# Patient Record
Sex: FEMALE | Race: WHITE | NOT HISPANIC OR LATINO | Employment: OTHER | URBAN - METROPOLITAN AREA
[De-identification: names, ages, dates, MRNs, and addresses within clinical notes are randomized per-mention and may not be internally consistent; named-entity substitution may affect disease eponyms.]

---

## 2020-06-30 ENCOUNTER — OUTSIDE FACILITY SERVICE (OUTPATIENT)
Dept: CARDIOLOGY | Facility: CLINIC | Age: 55
End: 2020-06-30

## 2020-07-01 PROCEDURE — 93307 TTE W/O DOPPLER COMPLETE: CPT | Performed by: INTERNAL MEDICINE

## 2020-07-02 PROCEDURE — 99253 IP/OBS CNSLTJ NEW/EST LOW 45: CPT | Performed by: INTERNAL MEDICINE

## 2020-07-06 ENCOUNTER — HOSPITAL ENCOUNTER (OUTPATIENT)
Facility: HOSPITAL | Age: 55
Discharge: HOME | End: 2020-07-07
Attending: INTERNAL MEDICINE | Admitting: INTERNAL MEDICINE

## 2020-07-06 DIAGNOSIS — I50.21 ACUTE SYSTOLIC CONGESTIVE HEART FAILURE (HCC): Primary | ICD-10-CM

## 2020-07-06 PROBLEM — I50.9 CHF (CONGESTIVE HEART FAILURE): Status: ACTIVE | Noted: 2020-07-06

## 2020-07-06 PROBLEM — I25.10 CAD (CORONARY ARTERY DISEASE): Status: ACTIVE | Noted: 2020-07-06

## 2020-07-06 PROBLEM — E66.9 CLASS 1 OBESITY IN ADULT: Status: ACTIVE | Noted: 2020-07-06

## 2020-07-06 PROBLEM — E78.5 HYPERLIPIDEMIA: Status: ACTIVE | Noted: 2020-07-06

## 2020-07-06 PROBLEM — E11.9 DIABETES MELLITUS (HCC): Status: ACTIVE | Noted: 2020-07-06

## 2020-07-06 PROBLEM — N18.9 CHRONIC KIDNEY DISEASE (CKD): Status: ACTIVE | Noted: 2020-07-06

## 2020-07-06 PROBLEM — Z72.0 TOBACCO ABUSE: Status: ACTIVE | Noted: 2020-07-06

## 2020-07-06 PROBLEM — I10 HYPERTENSION: Status: ACTIVE | Noted: 2020-07-06

## 2020-07-06 PROBLEM — G47.30 SLEEP APNEA: Status: ACTIVE | Noted: 2020-07-06

## 2020-07-06 LAB
ACT BLD: 246 SECONDS (ref 82–152)
CHOLEST SERPL-MCNC: 196 MG/DL (ref 0–200)
GLUCOSE BLDC GLUCOMTR-MCNC: 136 MG/DL (ref 70–130)
GLUCOSE BLDC GLUCOMTR-MCNC: 273 MG/DL (ref 70–130)
HBA1C MFR BLD: 12.9 % (ref 4.8–5.6)
HDLC SERPL-MCNC: 55 MG/DL (ref 40–60)
HOLD SPECIMEN: NORMAL
LDLC SERPL CALC-MCNC: 112 MG/DL (ref 0–100)
LDLC/HDLC SERPL: 2.03 {RATIO}
SARS-COV-2 RNA RESP QL NAA+PROBE: NOT DETECTED
TRIGL SERPL-MCNC: 146 MG/DL (ref 0–150)
VLDLC SERPL-MCNC: 29.2 MG/DL
WHOLE BLOOD HOLD SPECIMEN: NORMAL
WHOLE BLOOD HOLD SPECIMEN: NORMAL

## 2020-07-06 PROCEDURE — 93458 L HRT ARTERY/VENTRICLE ANGIO: CPT | Performed by: INTERNAL MEDICINE

## 2020-07-06 PROCEDURE — C1874 STENT, COATED/COV W/DEL SYS: HCPCS | Performed by: INTERNAL MEDICINE

## 2020-07-06 PROCEDURE — 87635 SARS-COV-2 COVID-19 AMP PRB: CPT | Performed by: PHYSICIAN ASSISTANT

## 2020-07-06 PROCEDURE — G0378 HOSPITAL OBSERVATION PER HR: HCPCS

## 2020-07-06 PROCEDURE — C9600 PERC DRUG-EL COR STENT SING: HCPCS | Performed by: INTERNAL MEDICINE

## 2020-07-06 PROCEDURE — 80061 LIPID PANEL: CPT | Performed by: PHYSICIAN ASSISTANT

## 2020-07-06 PROCEDURE — 25010000002 MIDAZOLAM PER 1 MG: Performed by: INTERNAL MEDICINE

## 2020-07-06 PROCEDURE — C1769 GUIDE WIRE: HCPCS | Performed by: INTERNAL MEDICINE

## 2020-07-06 PROCEDURE — 99204 OFFICE O/P NEW MOD 45 MIN: CPT | Performed by: PHYSICIAN ASSISTANT

## 2020-07-06 PROCEDURE — C9803 HOPD COVID-19 SPEC COLLECT: HCPCS

## 2020-07-06 PROCEDURE — 93010 ELECTROCARDIOGRAM REPORT: CPT | Performed by: INTERNAL MEDICINE

## 2020-07-06 PROCEDURE — 93005 ELECTROCARDIOGRAM TRACING: CPT | Performed by: INTERNAL MEDICINE

## 2020-07-06 PROCEDURE — C1887 CATHETER, GUIDING: HCPCS | Performed by: INTERNAL MEDICINE

## 2020-07-06 PROCEDURE — 82962 GLUCOSE BLOOD TEST: CPT

## 2020-07-06 PROCEDURE — 0 IOPAMIDOL PER 1 ML: Performed by: INTERNAL MEDICINE

## 2020-07-06 PROCEDURE — 83036 HEMOGLOBIN GLYCOSYLATED A1C: CPT | Performed by: PHYSICIAN ASSISTANT

## 2020-07-06 PROCEDURE — 25010000002 HEPARIN (PORCINE) PER 1000 UNITS: Performed by: INTERNAL MEDICINE

## 2020-07-06 PROCEDURE — 85347 COAGULATION TIME ACTIVATED: CPT

## 2020-07-06 PROCEDURE — C1894 INTRO/SHEATH, NON-LASER: HCPCS | Performed by: INTERNAL MEDICINE

## 2020-07-06 PROCEDURE — 92928 PRQ TCAT PLMT NTRAC ST 1 LES: CPT | Performed by: INTERNAL MEDICINE

## 2020-07-06 DEVICE — XIENCE SIERRA™ EVEROLIMUS ELUTING CORONARY STENT SYSTEM 3.50 MM X 38 MM / RAPID-EXCHANGE
Type: IMPLANTABLE DEVICE | Status: FUNCTIONAL
Brand: XIENCE SIERRA™

## 2020-07-06 RX ORDER — ASPIRIN 81 MG/1
81 TABLET, CHEWABLE ORAL DAILY
Status: DISCONTINUED | OUTPATIENT
Start: 2020-07-06 | End: 2020-07-06 | Stop reason: SDUPTHER

## 2020-07-06 RX ORDER — SODIUM CHLORIDE 9 MG/ML
100 INJECTION, SOLUTION INTRAVENOUS CONTINUOUS
Status: ACTIVE | OUTPATIENT
Start: 2020-07-06 | End: 2020-07-06

## 2020-07-06 RX ORDER — CLOPIDOGREL BISULFATE 75 MG/1
75 TABLET ORAL DAILY
Status: DISCONTINUED | OUTPATIENT
Start: 2020-07-07 | End: 2020-07-07 | Stop reason: HOSPADM

## 2020-07-06 RX ORDER — NITROGLYCERIN 0.4 MG/1
0.4 TABLET SUBLINGUAL
Status: DISCONTINUED | OUTPATIENT
Start: 2020-07-06 | End: 2020-07-06

## 2020-07-06 RX ORDER — ASPIRIN 81 MG/1
81 TABLET, CHEWABLE ORAL DAILY
COMMUNITY
End: 2022-08-08 | Stop reason: SDUPTHER

## 2020-07-06 RX ORDER — FUROSEMIDE 20 MG/1
20 TABLET ORAL 2 TIMES DAILY
COMMUNITY
End: 2020-08-20 | Stop reason: SDUPTHER

## 2020-07-06 RX ORDER — ALBUTEROL SULFATE 90 UG/1
2 AEROSOL, METERED RESPIRATORY (INHALATION) AS NEEDED
COMMUNITY
End: 2022-02-03

## 2020-07-06 RX ORDER — NICOTINE POLACRILEX 4 MG
15 LOZENGE BUCCAL
Status: DISCONTINUED | OUTPATIENT
Start: 2020-07-06 | End: 2020-07-07 | Stop reason: HOSPADM

## 2020-07-06 RX ORDER — ASPIRIN 325 MG
325 TABLET, DELAYED RELEASE (ENTERIC COATED) ORAL DAILY
Status: DISCONTINUED | OUTPATIENT
Start: 2020-07-07 | End: 2020-07-06

## 2020-07-06 RX ORDER — ONDANSETRON 2 MG/ML
4 INJECTION INTRAMUSCULAR; INTRAVENOUS EVERY 8 HOURS PRN
Status: DISCONTINUED | OUTPATIENT
Start: 2020-07-06 | End: 2020-07-06

## 2020-07-06 RX ORDER — RAMIPRIL 2.5 MG/1
2.5 CAPSULE ORAL DAILY
COMMUNITY
End: 2020-11-12 | Stop reason: SDUPTHER

## 2020-07-06 RX ORDER — ACETAMINOPHEN 325 MG/1
650 TABLET ORAL EVERY 4 HOURS PRN
Status: DISCONTINUED | OUTPATIENT
Start: 2020-07-06 | End: 2020-07-07 | Stop reason: HOSPADM

## 2020-07-06 RX ORDER — ASPIRIN 81 MG/1
81 TABLET, CHEWABLE ORAL DAILY
Status: DISCONTINUED | OUTPATIENT
Start: 2020-07-06 | End: 2020-07-07 | Stop reason: HOSPADM

## 2020-07-06 RX ORDER — SODIUM CHLORIDE 0.9 % (FLUSH) 0.9 %
10 SYRINGE (ML) INJECTION AS NEEDED
Status: DISCONTINUED | OUTPATIENT
Start: 2020-07-06 | End: 2020-07-06

## 2020-07-06 RX ORDER — ASPIRIN 325 MG
325 TABLET ORAL ONCE
Status: DISCONTINUED | OUTPATIENT
Start: 2020-07-06 | End: 2020-07-06

## 2020-07-06 RX ORDER — CARVEDILOL 3.12 MG/1
3.12 TABLET ORAL 2 TIMES DAILY WITH MEALS
COMMUNITY
Start: 2020-04-01 | End: 2021-04-12 | Stop reason: SDUPTHER

## 2020-07-06 RX ORDER — SODIUM CHLORIDE 0.9 % (FLUSH) 0.9 %
3 SYRINGE (ML) INJECTION EVERY 12 HOURS SCHEDULED
Status: DISCONTINUED | OUTPATIENT
Start: 2020-07-06 | End: 2020-07-06

## 2020-07-06 RX ORDER — FUROSEMIDE 20 MG/1
20 TABLET ORAL DAILY
Status: DISCONTINUED | OUTPATIENT
Start: 2020-07-06 | End: 2020-07-07 | Stop reason: HOSPADM

## 2020-07-06 RX ORDER — MIDAZOLAM HYDROCHLORIDE 1 MG/ML
INJECTION INTRAMUSCULAR; INTRAVENOUS AS NEEDED
Status: DISCONTINUED | OUTPATIENT
Start: 2020-07-06 | End: 2020-07-06 | Stop reason: HOSPADM

## 2020-07-06 RX ORDER — CLOPIDOGREL BISULFATE 75 MG/1
TABLET ORAL AS NEEDED
Status: DISCONTINUED | OUTPATIENT
Start: 2020-07-06 | End: 2020-07-06 | Stop reason: HOSPADM

## 2020-07-06 RX ORDER — HEPARIN SODIUM 1000 [USP'U]/ML
INJECTION, SOLUTION INTRAVENOUS; SUBCUTANEOUS AS NEEDED
Status: DISCONTINUED | OUTPATIENT
Start: 2020-07-06 | End: 2020-07-06 | Stop reason: HOSPADM

## 2020-07-06 RX ORDER — RAMIPRIL 2.5 MG/1
2.5 CAPSULE ORAL DAILY
Status: DISCONTINUED | OUTPATIENT
Start: 2020-07-06 | End: 2020-07-07 | Stop reason: HOSPADM

## 2020-07-06 RX ORDER — ALBUTEROL SULFATE 2.5 MG/3ML
2.5 SOLUTION RESPIRATORY (INHALATION) EVERY 6 HOURS PRN
Status: DISCONTINUED | OUTPATIENT
Start: 2020-07-06 | End: 2020-07-07 | Stop reason: HOSPADM

## 2020-07-06 RX ORDER — DEXTROSE MONOHYDRATE 25 G/50ML
25 INJECTION, SOLUTION INTRAVENOUS
Status: DISCONTINUED | OUTPATIENT
Start: 2020-07-06 | End: 2020-07-07 | Stop reason: HOSPADM

## 2020-07-06 RX ORDER — CARVEDILOL 3.12 MG/1
3.12 TABLET ORAL 2 TIMES DAILY WITH MEALS
Status: DISCONTINUED | OUTPATIENT
Start: 2020-07-06 | End: 2020-07-07 | Stop reason: HOSPADM

## 2020-07-06 RX ORDER — LIDOCAINE HYDROCHLORIDE 10 MG/ML
INJECTION, SOLUTION EPIDURAL; INFILTRATION; INTRACAUDAL; PERINEURAL AS NEEDED
Status: DISCONTINUED | OUTPATIENT
Start: 2020-07-06 | End: 2020-07-06 | Stop reason: HOSPADM

## 2020-07-06 RX ADMIN — CARVEDILOL 3.12 MG: 3.12 TABLET, FILM COATED ORAL at 17:03

## 2020-07-06 RX ADMIN — SODIUM CHLORIDE 100 ML/HR: 9 INJECTION, SOLUTION INTRAVENOUS at 15:44

## 2020-07-06 RX ADMIN — FUROSEMIDE 20 MG: 20 TABLET ORAL at 15:44

## 2020-07-06 RX ADMIN — RAMIPRIL 2.5 MG: 2.5 CAPSULE ORAL at 17:03

## 2020-07-06 RX ADMIN — ASPIRIN 81 MG 81 MG: 81 TABLET ORAL at 15:44

## 2020-07-06 NOTE — H&P
[facility] at [facility]        Date of Hospital Visit: 20      Place of Service: [facility]    Patient Name: Asuncion Delcid  :1965    Primary Care Provider: Brett Strong MD    Chief complaint/Reason for Consultation:  Systolic CHF    Problem List:  Patient Active Problem List    Diagnosis    1 CHF (congestive heart failure) (CMS/HCC)      · EF 40-45%   2 Hypertension    3 Hyperlipidemia    4 Sleep apnea    5 Diabetes mellitus (CMS/HCC)    6 Class 1 obesity in adult    7 Tobacco abuse    8 Chronic kidney disease (CKD)      History of Present Illness:  This 55-year-old hypertensive dyslipidemic, diabetic female smoker with no known prior cardiac history.  She presented to [facility] emergency department last week with a complaint of progressive dyspnea and swelling.  She ruled in for acute congestive heart failure and was admitted for diuresis.  She had an echocardiogram during that admission showing an ejection fraction of 40 to 45%.  Cardiac catheterization was recommended for evaluation of new onset congestive heart failure.  Her hemoglobin A1c was 13.  Cardiac enzymes were negative.  She has stated history of chronic kidney disease however her serum creatinine remained within normal range during diuresis.  She denies any history of prior cardiac testing.  She has no current complaint of exertional chest pain, denies orthopnea, PND, claudication.  She has had lower extremity edema.  She has no awareness of tachyarrhythmias, no dizziness or syncope.    History reviewed. No pertinent surgical history.    No Known Allergies    Medications Prior to Admission   Medication Sig Dispense Refill Last Dose   • aspirin 81 MG chewable tablet Chew 81 mg Daily.      • furosemide (LASIX) 20 MG tablet Take 20 mg by mouth Daily.      • insulin aspart (novoLOG) 100 UNIT/ML injection Inject  under the skin into the appropriate area as directed 3 (Three) Times a Day  "Before Meals.      • ramipril (ALTACE) 5 MG capsule Take 5 mg by mouth Daily.          No current facility-administered medications for this encounter.       Social History     Socioeconomic History   • Marital status:      Spouse name: Not on file   • Number of children: Not on file   • Years of education: Not on file   • Highest education level: Not on file   Tobacco Use   • Smoking status: Current Every Day Smoker     Packs/day: 0.50     Years: 20.00     Pack years: 10.00     Types: Cigarettes   • Smokeless tobacco: Never Used   Substance and Sexual Activity   • Alcohol use: Never     Frequency: Never   • Drug use: Never   • Sexual activity: Defer       History reviewed. No pertinent family history.    REVIEW OF SYSTEMS:   Review of Systems   Constitution: Negative.   HENT: Negative.    Eyes: Negative.    Cardiovascular: Positive for leg swelling.   Respiratory: Positive for shortness of breath.    Endocrine: Negative.    Hematologic/Lymphatic: Negative.    Skin: Negative.    Musculoskeletal: Negative.    Gastrointestinal: Negative.    Genitourinary: Negative.    Neurological: Negative.    Psychiatric/Behavioral: Negative.    Allergic/Immunologic: Negative.    All other systems reviewed and are negative.           Objective:  Vitals:    07/06/20 1001 07/06/20 1002   BP: 138/84 136/74   BP Location: Right arm Right arm   Patient Position: Lying    Pulse: 89    Resp: 18    Temp: 98.1 °F (36.7 °C)    TempSrc: Oral    SpO2: 94%    Weight: 90.9 kg (200 lb 8 oz)    Height: 162.6 cm (64\")      Body mass index is 34.42 kg/m².  Flowsheet Rows      First Filed Value   Admission Height  162.6 cm (64\") Documented at 07/06/2020 1001   Admission Weight  90.9 kg (200 lb 8 oz) Documented at 07/06/2020 1001        No intake or output data in the 24 hours ending 07/06/20 1052    Physical Exam   Constitutional: She is oriented to person, place, and time. She appears well-developed and well-nourished. No distress.   HENT: "   Head: Normocephalic and atraumatic.   Mouth/Throat: Oropharynx is clear and moist.   Eyes: Pupils are equal, round, and reactive to light. No scleral icterus.   Neck: Neck supple. No JVD present. No tracheal deviation present. No thyromegaly present.   Cardiovascular: Normal rate, regular rhythm and normal heart sounds. Exam reveals no gallop and no friction rub.   No murmur heard.  Pulmonary/Chest: Effort normal and breath sounds normal. No respiratory distress. She has no wheezes. She has no rales.   Abdominal: Soft. Bowel sounds are normal. She exhibits no distension and no mass. There is no tenderness. There is no rebound and no guarding.   Musculoskeletal: Normal range of motion. She exhibits no edema or deformity.   Lymphadenopathy:     She has no cervical adenopathy.   Neurological: She is alert and oriented to person, place, and time. No cranial nerve deficit.   Skin: Skin is warm and dry. No rash noted. She is not diaphoretic.   Psychiatric: She has a normal mood and affect.   Nursing note and vitals reviewed.            Barbaeu Test:  Left: Normal  (oxymetric Allens) Right: Not Assessed     Lab Review:       Assessment :   1.  New onset systolic heart failure and newly diagnosed cardiomyopathy of unclear etiology  2.  Multiple, poorly controlled cardiac risk factors  3.  Hypertension  4.  Dyslipidemia  5.  Diabetes mellitus type 2, poorly controlled  6.  Tobacco abuse    Plan:   Left heart catheterization possible catheter-based intervention  Continue to optimize medical management per standard guidelines, medical therapy will be further optimized following the procedure and depending on findings.    Electronically signed by DEENA Marquez, 07/06/20, 10:57 AM.    IJose Guadalupe MD, personally performed the services described in this documentation as scribed by the above named individual in my presence, and it is both accurate and complete.  7/6/2020  11:49

## 2020-07-06 NOTE — NURSING NOTE
ACC REVIEW REPORT: [facility]        PATIENT NAME: Asuncion Delcid    PATIENT ID: [id number]        COVID-19 ACC SCREENING       DOES THE PATIENT HAVE A FEVER GREATER THAN OR EQUAL .4: no    IS THE PATIENT EXPERIENCING SHORTNESS OF BREATH: no    DOES THE PATIENT HAVE A COUGH: no    DOES THE PATIENT HAVE ANY OF THE FOLLOWING RISK FACTORS:    EXPOSURE TO SUSPECTED OR KNOWN COVID-19: no    RECENT TRAVEL HISTORY TO ENDEMIC AREA (DOMESTIC/LOCAL): no    IS THE PATIENT A HEALTHCARE WORKER: no    HAS THE PATIENT BEEN TESTED FOR COVID-19: no    DATE TESTED: n/a    LAB TESTING SENT TO: n/a          BED: S502    BED TYPE: observation tele    BED GIVEN TO: Violette Bishop RN    TIME BED GIVEN: 0640    YOB: 1965    AGE: 55    GENDER: female    PREVIOUS ADMIT TO [facility]:     PREVIOUS ADMISSION DATE:     PATIENT CLASS: observation    TODAY'S DATE: 7/6/2020    TRANSFER DATE: 7/6/2020    ETA: 0900    TRANSFERRING FACILITY: [facility]    TRANSFERRING FACILITY PHONE # : [phone]    TRANSFERRING MD: Dr. Solis    DATE/TIME REQUEST RECEIVED: 7/2 @ 1269    [facility] RN: Nina Salazar RN    REPORT FROM: Danica Bishop RN    TIME REPORT TAKEN: 0620    DIAGNOSIS: Heart Failure    REASON FOR TRANSFER TO [facility]: higher level of care    TRANSPORTATION: local transport    CLINICAL REASON FOR TRANSFER TO [facility]: heart cath      CLINICAL INFORMATION    HEIGHT: 64 inches    WEIGHT: 251 lbs    ALLERGIES: PCN, codeine    VELASCO: no    INFECTIOUS DISEASE: no    ISOLATION: no    LAST VITAL SIGNS:  TIME: 0600  TEMP: 98.2  PULSE: 94  B/P: 108/67  RESP: 20    LAB INFORMATION: K 5.3, BUN/Cr 31/1.0, Glucose 326    CULTURE INFORMATION: no    MEDS/IV FLUIDS: #18 right Forearm - SL  Receives Lasix IV q12hrs  Remaining med list to be sent with patient      CARDIAC SYSTEM:    CHEST PAIN: no    RATE:     SCALE:     RHYTHM: NSR    Is patient taking or has patient been given any drugs that could increase bleeding? no  (Plavix,  Brilinta, Effient, Eliquis, Xarelto, Warfarin, Integrilin, Angiomax)    DRUG:      DOSE/FREQUENCY:       CARDIAC NOTES: stable      RESPIRATORY SYSTEM:    LUNG SOUNDS:    CLEAR: y  CRACKLES: n  WHEEZES: n  RHONCHI: n  DIMINISHED: n      RADIOLOGY RESULTS: 6/30 CXR - interstitial lung disease    RESPIRATORY STATUS: stable      CNS/MUSCULOSKELETAL    ALERT AND ORIENTED:    PERSON: y  PLACE: y  TIME: y    MARGE COMA SCALE:   15      CNS/MUSCULOSKELETAL NOTES: up ad darell, baseline independent.      GI//GY      ABDOMINAL PAIN: n    VOMITING: n    DIARRHEA: n    NAUSEA: n    BOWEL SOUNDS: normal, Last BM was 7/4/2020    GI//GY NOTES: 1000 cc fluid restrictions a day    PAST MEDICAL HISTORY: HTN, DM, Syncope, Sleep apnea, CKD, PNA, UTI, March 2020 - EF 40-45%    OTHER SYMPTOM NOTES:     ADDITIONAL NOTES: Patient admitted on 6/30 with diagnosed with CHF, She presented to ED with c/o bilateral LE swelling and shortness of breath.           Jody Salazar RN  7/6/2020  06:21

## 2020-07-06 NOTE — PLAN OF CARE
Pt was a direct admit today, Left heart cath completed today, no complaints of pain, VSS on RA, NSR on the monitor, pt up adlib, TR band currently in place.

## 2020-07-07 VITALS
TEMPERATURE: 98.5 F | HEIGHT: 64 IN | DIASTOLIC BLOOD PRESSURE: 55 MMHG | SYSTOLIC BLOOD PRESSURE: 119 MMHG | WEIGHT: 200.5 LBS | HEART RATE: 78 BPM | BODY MASS INDEX: 34.23 KG/M2 | OXYGEN SATURATION: 88 % | RESPIRATION RATE: 18 BRPM

## 2020-07-07 LAB
ANION GAP SERPL CALCULATED.3IONS-SCNC: 10 MMOL/L (ref 5–15)
BUN SERPL-MCNC: 30 MG/DL (ref 6–20)
BUN/CREAT SERPL: 32.6 (ref 7–25)
CALCIUM SPEC-SCNC: 8.3 MG/DL (ref 8.6–10.5)
CHLORIDE SERPL-SCNC: 103 MMOL/L (ref 98–107)
CHOLEST SERPL-MCNC: 149 MG/DL (ref 0–200)
CO2 SERPL-SCNC: 23 MMOL/L (ref 22–29)
CREAT SERPL-MCNC: 0.92 MG/DL (ref 0.57–1)
DEPRECATED RDW RBC AUTO: 46.7 FL (ref 37–54)
ERYTHROCYTE [DISTWIDTH] IN BLOOD BY AUTOMATED COUNT: 19.1 % (ref 12.3–15.4)
GFR SERPL CREATININE-BSD FRML MDRD: 63 ML/MIN/1.73
GLUCOSE BLDC GLUCOMTR-MCNC: 276 MG/DL (ref 70–130)
GLUCOSE SERPL-MCNC: 196 MG/DL (ref 65–99)
HCT VFR BLD AUTO: 38.3 % (ref 34–46.6)
HDLC SERPL-MCNC: 39 MG/DL (ref 40–60)
HGB BLD-MCNC: 10.7 G/DL (ref 12–15.9)
LDLC SERPL CALC-MCNC: 69 MG/DL (ref 0–100)
LDLC/HDLC SERPL: 1.76 {RATIO}
MCH RBC QN AUTO: 20 PG (ref 26.6–33)
MCHC RBC AUTO-ENTMCNC: 27.9 G/DL (ref 31.5–35.7)
MCV RBC AUTO: 71.5 FL (ref 79–97)
PLATELET # BLD AUTO: 378 10*3/MM3 (ref 140–450)
PMV BLD AUTO: 10.9 FL (ref 6–12)
POTASSIUM SERPL-SCNC: 4.2 MMOL/L (ref 3.5–5.2)
RBC # BLD AUTO: 5.36 10*6/MM3 (ref 3.77–5.28)
SODIUM SERPL-SCNC: 136 MMOL/L (ref 136–145)
TRIGL SERPL-MCNC: 206 MG/DL (ref 0–150)
VLDLC SERPL-MCNC: 41.2 MG/DL
WBC # BLD AUTO: 8.63 10*3/MM3 (ref 3.4–10.8)

## 2020-07-07 PROCEDURE — G0378 HOSPITAL OBSERVATION PER HR: HCPCS

## 2020-07-07 PROCEDURE — 93010 ELECTROCARDIOGRAM REPORT: CPT | Performed by: INTERNAL MEDICINE

## 2020-07-07 PROCEDURE — 80048 BASIC METABOLIC PNL TOTAL CA: CPT | Performed by: INTERNAL MEDICINE

## 2020-07-07 PROCEDURE — 80061 LIPID PANEL: CPT | Performed by: INTERNAL MEDICINE

## 2020-07-07 PROCEDURE — 82962 GLUCOSE BLOOD TEST: CPT

## 2020-07-07 PROCEDURE — 63710000001 INSULIN LISPRO (HUMAN) PER 5 UNITS: Performed by: INTERNAL MEDICINE

## 2020-07-07 PROCEDURE — 93005 ELECTROCARDIOGRAM TRACING: CPT | Performed by: INTERNAL MEDICINE

## 2020-07-07 PROCEDURE — 85027 COMPLETE CBC AUTOMATED: CPT | Performed by: INTERNAL MEDICINE

## 2020-07-07 PROCEDURE — 99214 OFFICE O/P EST MOD 30 MIN: CPT | Performed by: INTERNAL MEDICINE

## 2020-07-07 RX ORDER — CLOPIDOGREL BISULFATE 75 MG/1
75 TABLET ORAL DAILY
Qty: 90 TABLET | Refills: 3 | Status: SHIPPED | OUTPATIENT
Start: 2020-07-07 | End: 2021-04-12 | Stop reason: SDUPTHER

## 2020-07-07 RX ORDER — ATORVASTATIN CALCIUM 40 MG/1
20 TABLET, FILM COATED ORAL DAILY
Qty: 90 TABLET | Refills: 3 | Status: SHIPPED | OUTPATIENT
Start: 2020-07-07 | End: 2021-04-12 | Stop reason: SDUPTHER

## 2020-07-07 RX ADMIN — RAMIPRIL 2.5 MG: 2.5 CAPSULE ORAL at 08:07

## 2020-07-07 RX ADMIN — ASPIRIN 81 MG 81 MG: 81 TABLET ORAL at 08:06

## 2020-07-07 RX ADMIN — FUROSEMIDE 20 MG: 20 TABLET ORAL at 08:06

## 2020-07-07 RX ADMIN — INSULIN LISPRO 4 UNITS: 100 INJECTION, SOLUTION INTRAVENOUS; SUBCUTANEOUS at 08:07

## 2020-07-07 RX ADMIN — CARVEDILOL 3.12 MG: 3.12 TABLET, FILM COATED ORAL at 08:07

## 2020-07-07 RX ADMIN — CLOPIDOGREL BISULFATE 75 MG: 75 TABLET ORAL at 08:07

## 2020-07-07 NOTE — PLAN OF CARE
Problem: Patient Care Overview  Goal: Plan of Care Review  Outcome: Ongoing (interventions implemented as appropriate)  Flowsheets (Taken 7/7/2020 0750)  Plan of Care Reviewed With: patient  Note:   Pt returned from a the cath lab after PCI to the right coronary with ISAIAH placement. PT has left wrist Transradial band. Last 2ml of air removed and no further bleeding or hematoma noted. Site was monitored per protocol..denies CP or SOA.. Pt noted with decreased oxygen sats so oxygen placed at two liters n/c while sleeping.. Insulin at 273 but pt just ate sweets when obtained. She was monitored closely thru the night ..   Goal: Individualization and Mutuality  Outcome: Ongoing (interventions implemented as appropriate)  Goal: Discharge Needs Assessment  Outcome: Ongoing (interventions implemented as appropriate)  Goal: Interprofessional Rounds/Family Conf  Outcome: Ongoing (interventions implemented as appropriate)     Problem: Cardiac: Heart Failure (Adult)  Goal: Signs and Symptoms of Listed Potential Problems Will be Absent, Minimized or Managed (Cardiac: Heart Failure)  Outcome: Ongoing (interventions implemented as appropriate)

## 2020-07-07 NOTE — PROGRESS NOTES
Referral received for Phase II Cardiac Rehab.  Staff reviewed chart and patient has qualifying diagnosis for Phase II Cardiac Rehab. Staff is not consulting inpatients at this time due to the COVID-19 pandemic. Staff will contact patient in regards to scheduling or referring to another facility.

## 2020-07-07 NOTE — PROGRESS NOTES
"[facility] at [facility]  IP Progress Note      Chief Complaint: Follow-up of cardiomyopathy and CAD.    Subjective   Felt well overnight, no chest pain or shortness of breath.  Ambulating in the room.  Wishes to go home.    Objective     Blood pressure 119/55, pulse 78, temperature 98.5 °F (36.9 °C), temperature source Oral, resp. rate 18, height 162.6 cm (64\"), weight 90.9 kg (200 lb 8 oz), SpO2 (!) 88 %.     Intake/Output Summary (Last 24 hours) at 7/7/2020 0717  Last data filed at 7/6/2020 1758  Gross per 24 hour   Intake 960 ml   Output --   Net 960 ml       Physical Exam:  General: No acute distress.   Neck: no JVD.  Chest:No respiratory distress, breath sounds are normal. No wheezes,  rhonchi or rales.  Cardiovascular: Normal S1 and S2, no murmer, gallop or rub.    Extremities: No edema.  Left wrist stable.    Results Review:     I reviewed the patient's new clinical results.    Results from last 7 days   Lab Units 07/07/20  0340   WBC 10*3/mm3 8.63   HEMOGLOBIN g/dL 10.7*   HEMATOCRIT % 38.3   PLATELETS 10*3/mm3 378     Results from last 7 days   Lab Units 07/07/20  0340   SODIUM mmol/L 136   POTASSIUM mmol/L 4.2   CHLORIDE mmol/L 103   CO2 mmol/L 23.0   BUN mg/dL 30*   CREATININE mg/dL 0.92   CALCIUM mg/dL 8.3*   GLUCOSE mg/dL 196*     Results from last 7 days   Lab Units 07/07/20  0340   SODIUM mmol/L 136   POTASSIUM mmol/L 4.2   CHLORIDE mmol/L 103   CO2 mmol/L 23.0   BUN mg/dL 30*   CREATININE mg/dL 0.92   GLUCOSE mg/dL 196*   CALCIUM mg/dL 8.3*         No results found for: CKTOTAL, CKMB, CKMBINDEX, TROPONINI, TROPONINT      Results from last 7 days   Lab Units 07/07/20  0340   CHOLESTEROL mg/dL 149   TRIGLYCERIDES mg/dL 206*   HDL CHOL mg/dL 39*   LDL CHOL mg/dL 69           Tele: Sinus Rythym      Assessment:  1. New onset SHF and newly diagnosed cardiomyopathy, EF 45%.  2. CAD, status post stent of the RCA, nonobstructive disease of the left coronaries, EF " 45%.  3. Hypertension, controlled.  4. Type 2 diabetes.  5. Dyslipidemia.  6. Tobacco abuse.  Cessation recommended.    Plan:  1. Stable post intervention, will discharge to home.  2. Continue home medications with further addition of Plavix and Lipitor.  3. Follow-up with Dr. Valadez at [facility] in 4 weeks.  4. Condition stable for discharge.    Jose Guadalupe Humphrey MD, FACC, [facility]

## 2020-07-07 NOTE — PROGRESS NOTES
Continued Stay Note  [facility]     Patient Name: Asuncion Delcid  MRN: [id number]  Today's Date: 7/7/2020    Admit Date: 7/6/2020    Discharge Plan     Row Name 07/07/20 1045       Plan    Plan  DC to home    Patient/Family in Agreement with Plan  other (see comments)    Plan Comments  The pt was DCed before I could speak with her. No DC needs identified.    Row Name 07/07/20 0842       Plan    Final Discharge Disposition Code  01 - home or self-care        Discharge Codes    No documentation.       Expected Discharge Date and Time     Expected Discharge Date Expected Discharge Time    Jul 7, 2020             Mackenzie Rodriguez RN

## 2020-07-08 ENCOUNTER — DOCUMENTATION (OUTPATIENT)
Dept: CARDIAC REHAB | Facility: HOSPITAL | Age: 55
End: 2020-07-08

## 2020-07-09 ENCOUNTER — DOCUMENTATION (OUTPATIENT)
Dept: CARDIAC REHAB | Facility: HOSPITAL | Age: 55
End: 2020-07-09

## 2020-07-09 NOTE — PROGRESS NOTES
Cardiac Rehab staff mailed referral letter to patient regarding Phase II Cardiac Rehab program. Instruction for patient to contact [facility] Cardiac Rehab Department for additional program information and to forward referral to closest facility.

## 2020-07-14 NOTE — DISCHARGE SUMMARY
Final diagnosis:  1. New onset SHF and newly diagnosed cardiomyopathy, EF 45%.  2. CAD, status post stent of the RCA, nonobstructive disease of the left coronaries, EF 45%.  3. Hypertension, controlled.  4. Type 2 diabetes.  5. Dyslipidemia.  6. Tobacco abuse.

## 2020-08-10 ENCOUNTER — OUTSIDE FACILITY SERVICE (OUTPATIENT)
Dept: CARDIOLOGY | Facility: CLINIC | Age: 55
End: 2020-08-10

## 2020-08-10 PROCEDURE — 93308 TTE F-UP OR LMTD: CPT | Performed by: INTERNAL MEDICINE

## 2020-08-19 NOTE — PROGRESS NOTES
[facility] at [facility]  Follow Up Visit  Asuncion Delcid  1965    VISIT DATE:  08/20/20    [facility]   Brett Strong  [facility] [id number]          CC:  Congestive Heart Failure      Problem List:  1.  Ischemic cardiomyopathy/systolic heart failure  -Cardiac cath July 2020: 75% stenosis of mid RCA stented with 3 x 5 x 38 mm ISAIAH  -Nonobstructive 40% LAD stenosis, 30% circumflex stenosis  -EF 30 to 35% by echo August 2020  -EF 45% at time of left heart catheterization  2.  HTN  3.  HLD  4.  DM  5.  Obesity  6.  CKD  7.  Tobacco abuse    Echo [facility] August 2020  -EF 30 to 35%    Echo in August 2020: EF 36 to 40% global hypokinesis, inferior akinesis, mild mitral regurgitation, mild aortic regurgitation, mild to moderate tricuspid regurgitation    History of Present Illness:  Asuncion Delcid  Is a 55 y.o. female with pertinent cardiac history detailed above.  Patient initially seen as a consult at [facility] with decompensated systolic heart failure.  Referred to [facility] where she underwent PCI to the RCA in July.  Discharged home on aspirin and Plavix.  Medications for heart failure include ramipril and Coreg.    Patient was admitted again with shortness of breath on August 10.  She had increased lower extremity edema.  Elevated BNP and interstitial edema on chest x-ray.  Discharged on Lasix 20 mg p.o. daily.  Today looks to still have significant lower extremity edema.  Denies chest pain.  States feels overall better status post PCI.  No complications at left radial access site.  Denies dyspnea.  Most recent echo does show EF less than 35% so discussed LifeVest today and she is interested in pursuing      Patient Active Problem List    Diagnosis Date Noted   • CHF (congestive heart failure) (CMS/HCC) 07/06/2020     Note Last Updated: 7/6/2020     · EF 40-45%     • Hypertension 07/06/2020   • Hyperlipidemia  07/06/2020   • Sleep apnea 07/06/2020   • Chronic kidney disease (CKD) 07/06/2020   • Diabetes mellitus (CMS/HCC) 07/06/2020   • Class 1 obesity in adult 07/06/2020   • Tobacco abuse 07/06/2020   • CAD (coronary artery disease) 07/06/2020     Note Last Updated: 7/7/2020     · [facility] 7-6-20: 75% stenosis of the mid right coronary artery stented with 3.5 x 38 mm ISAIAH and reduced to 0%.  Mild left ventricular systolic dysfunction, estimated EF 45%.         Allergies   Allergen Reactions   • Codeine Unknown - Low Severity   • Penicillins Unknown - Low Severity       Social History     Socioeconomic History   • Marital status:      Spouse name: Not on file   • Number of children: Not on file   • Years of education: Not on file   • Highest education level: Not on file   Tobacco Use   • Smoking status: Current Every Day Smoker     Packs/day: 0.50     Years: 20.00     Pack years: 10.00     Types: Cigarettes   • Smokeless tobacco: Never Used   Substance and Sexual Activity   • Alcohol use: Never     Frequency: Never   • Drug use: Never   • Sexual activity: Defer       History reviewed. No pertinent family history.    Current Medications:    Current Outpatient Medications:   •  albuterol sulfate  (90 Base) MCG/ACT inhaler, Inhale 2 puffs Every 4 (Four) Hours As Needed for Wheezing or Shortness of Air., Disp: , Rfl:   •  aspirin 81 MG chewable tablet, Chew 81 mg Daily., Disp: , Rfl:   •  atorvastatin (LIPITOR) 40 MG tablet, Take 0.5 tablets by mouth Daily., Disp: 90 tablet, Rfl: 3  •  carvedilol (COREG) 3.125 MG tablet, Take 3.125 mg by mouth 2 (Two) Times a Day With Meals., Disp: , Rfl:   •  clopidogrel (PLAVIX) 75 MG tablet, Take 1 tablet by mouth Daily., Disp: 90 tablet, Rfl: 3  •  furosemide (LASIX) 20 MG tablet, Take 20 mg by mouth 2 (Two) Times a Day. 2 tablets twice daily, Disp: , Rfl:   •  insulin aspart (novoLOG) 100 UNIT/ML injection, Inject  under the skin into the appropriate area as directed 4 (Four)  "Times a Day With Meals & at Bedtime. Sliding scale, Disp: , Rfl:   •  ramipril (ALTACE) 2.5 MG capsule, Take 2.5 mg by mouth Daily., Disp: , Rfl:      Review of Systems   Cardiovascular: Positive for leg swelling. Negative for chest pain, dyspnea on exertion, irregular heartbeat, orthopnea, palpitations and syncope.   Respiratory: Negative for cough and shortness of breath.    All other systems reviewed and are negative.      Vitals:    08/20/20 1126   BP: 127/82   BP Location: Right arm   Patient Position: Sitting   Pulse: 83   SpO2: 95%   Weight: 96.6 kg (213 lb)   Height: 162.6 cm (64\")       Physical Exam   Constitutional: She is oriented to person, place, and time. She appears well-developed and well-nourished.   Eyes: EOM are normal.   Neck: Neck supple.   Cardiovascular: Normal rate, regular rhythm, normal heart sounds and intact distal pulses.   Pulmonary/Chest: Effort normal and breath sounds normal.   Abdominal: Soft.   Musculoskeletal: She exhibits edema (2+ lower extreme).   Neurological: She is alert and oriented to person, place, and time.   Skin: Skin is warm.       Diagnostic Data:  Procedures  Lab Results   Component Value Date    TRIG 206 (H) 07/07/2020    HDL 39 (L) 07/07/2020     Lab Results   Component Value Date    GLUCOSE 196 (H) 07/07/2020    BUN 30 (H) 07/07/2020    CREATININE 0.92 07/07/2020     07/07/2020    K 4.2 07/07/2020     07/07/2020    CO2 23.0 07/07/2020     Lab Results   Component Value Date    HGBA1C 12.90 (H) 07/06/2020     Lab Results   Component Value Date    WBC 8.63 07/07/2020    HGB 10.7 (L) 07/07/2020    HCT 38.3 07/07/2020     07/07/2020       Assessment:  No diagnosis found.    Plan:      1.  CAD  -Status post RCA PCI July 2020  -Continue aspirin, Plavix, statin  -Nonobstructive left-sided disease    2.  Systolic heart failure  -Varying EF by echo and LV grams, reviewed most recent echo that does look to be less than 35%  -On Coreg and " ramipril  -Appears volume overloaded today, increase Lasix to 40 mg twice daily and add 20 mEq of potassium  -Add Aldactone at next visit  -We discussed indications for LifeVest and she would like to proceed, schedule repeat echo in 2 months with [facility] to reassess EF      3.  HTN  -Controlled    4.  DM  -Patient is on insulin alone for her diabetes, will need to clarify with her PCP 1 or 2.  If this is type 2 recommend adding  Farxiga to her regimen    5.  HLD  -Continue statin      Follow-up 2 months    Niraj Valadez MD

## 2020-08-20 ENCOUNTER — OFFICE VISIT (OUTPATIENT)
Dept: CARDIOLOGY | Facility: CLINIC | Age: 55
End: 2020-08-20

## 2020-08-20 VITALS
WEIGHT: 213 LBS | HEART RATE: 83 BPM | BODY MASS INDEX: 36.37 KG/M2 | SYSTOLIC BLOOD PRESSURE: 127 MMHG | OXYGEN SATURATION: 95 % | HEIGHT: 64 IN | DIASTOLIC BLOOD PRESSURE: 82 MMHG

## 2020-08-20 DIAGNOSIS — I50.22 CHRONIC SYSTOLIC CONGESTIVE HEART FAILURE (HCC): Primary | ICD-10-CM

## 2020-08-20 PROCEDURE — 99214 OFFICE O/P EST MOD 30 MIN: CPT | Performed by: INTERNAL MEDICINE

## 2020-08-20 RX ORDER — FUROSEMIDE 40 MG/1
40 TABLET ORAL 2 TIMES DAILY
Qty: 60 TABLET | Refills: 6 | Status: SHIPPED | OUTPATIENT
Start: 2020-08-20 | End: 2020-08-20

## 2020-08-20 RX ORDER — POTASSIUM CHLORIDE 1500 MG/1
20 TABLET, FILM COATED, EXTENDED RELEASE ORAL DAILY
Qty: 30 TABLET | Refills: 6 | Status: SHIPPED | OUTPATIENT
Start: 2020-08-20 | End: 2021-04-15 | Stop reason: SDUPTHER

## 2020-08-20 RX ORDER — FUROSEMIDE 40 MG/1
40 TABLET ORAL 2 TIMES DAILY
Qty: 60 TABLET | Refills: 6 | Status: SHIPPED | OUTPATIENT
Start: 2020-08-20 | End: 2021-04-01

## 2020-10-06 ENCOUNTER — HOSPITAL ENCOUNTER (OUTPATIENT)
Dept: CARDIOLOGY | Facility: HOSPITAL | Age: 55
Discharge: HOME | End: 2020-10-06
Admitting: INTERNAL MEDICINE

## 2020-10-06 VITALS — HEIGHT: 64 IN | BODY MASS INDEX: 35.85 KG/M2 | WEIGHT: 210 LBS

## 2020-10-06 DIAGNOSIS — I50.22 CHRONIC SYSTOLIC CONGESTIVE HEART FAILURE (HCC): ICD-10-CM

## 2020-10-06 LAB
BH CV ECHO MEAS - AO MAX PG (FULL): 5.3 MMHG
BH CV ECHO MEAS - AO MAX PG: 9.8 MMHG
BH CV ECHO MEAS - AO ROOT AREA (BSA CORRECTED): 1.1
BH CV ECHO MEAS - AO ROOT AREA: 4.2 CM^2
BH CV ECHO MEAS - AO ROOT DIAM: 2.3 CM
BH CV ECHO MEAS - AO V2 MAX: 156.4 CM/SEC
BH CV ECHO MEAS - AVA(V,A): 2 CM^2
BH CV ECHO MEAS - AVA(V,D): 2 CM^2
BH CV ECHO MEAS - BSA(HAYCOCK): 2.1 M^2
BH CV ECHO MEAS - BSA: 2 M^2
BH CV ECHO MEAS - BZI_BMI: 36.6 KILOGRAMS/M^2
BH CV ECHO MEAS - BZI_METRIC_HEIGHT: 162.6 CM
BH CV ECHO MEAS - BZI_METRIC_WEIGHT: 96.6 KG
BH CV ECHO MEAS - EDV(CUBED): 54.9 ML
BH CV ECHO MEAS - EDV(MOD-SP2): 99 ML
BH CV ECHO MEAS - EDV(MOD-SP4): 135 ML
BH CV ECHO MEAS - EDV(TEICH): 62 ML
BH CV ECHO MEAS - EF(CUBED): 22.4 %
BH CV ECHO MEAS - EF(MOD-BP): 38 %
BH CV ECHO MEAS - EF(MOD-SP2): 35.4 %
BH CV ECHO MEAS - EF(MOD-SP4): 30.4 %
BH CV ECHO MEAS - EF(TEICH): 18.3 %
BH CV ECHO MEAS - ESV(CUBED): 42.7 ML
BH CV ECHO MEAS - ESV(MOD-SP2): 64 ML
BH CV ECHO MEAS - ESV(MOD-SP4): 94 ML
BH CV ECHO MEAS - ESV(TEICH): 50.7 ML
BH CV ECHO MEAS - FS: 8.1 %
BH CV ECHO MEAS - IVS/LVPW: 1.5
BH CV ECHO MEAS - IVSD: 0.8 CM
BH CV ECHO MEAS - LA DIMENSION: 4 CM
BH CV ECHO MEAS - LA/AO: 1.7
BH CV ECHO MEAS - LAD MAJOR: 5.2 CM
BH CV ECHO MEAS - LAT PEAK E' VEL: 10.6 CM/SEC
BH CV ECHO MEAS - LATERAL E/E' RATIO: 6.7
BH CV ECHO MEAS - LV DIASTOLIC VOL/BSA (35-75): 67.2 ML/M^2
BH CV ECHO MEAS - LV MASS(C)D: 163.5 GRAMS
BH CV ECHO MEAS - LV MASS(C)DI: 81.4 GRAMS/M^2
BH CV ECHO MEAS - LV MAX PG: 4.5 MMHG
BH CV ECHO MEAS - LV MEAN PG: 2.4 MMHG
BH CV ECHO MEAS - LV SYSTOLIC VOL/BSA (12-30): 46.8 ML/M^2
BH CV ECHO MEAS - LV V1 MAX: 105.5 CM/SEC
BH CV ECHO MEAS - LV V1 MEAN: 72.5 CM/SEC
BH CV ECHO MEAS - LV V1 VTI: 17.9 CM
BH CV ECHO MEAS - LVIDD: 3.8 CM
BH CV ECHO MEAS - LVIDS: 3.5 CM
BH CV ECHO MEAS - LVLD AP2: 8 CM
BH CV ECHO MEAS - LVLD AP4: 8.3 CM
BH CV ECHO MEAS - LVLS AP2: 7.4 CM
BH CV ECHO MEAS - LVLS AP4: 7.5 CM
BH CV ECHO MEAS - LVOT AREA (M): 3.1 CM^2
BH CV ECHO MEAS - LVOT AREA: 3 CM^2
BH CV ECHO MEAS - LVOT DIAM: 2 CM
BH CV ECHO MEAS - LVPWD: 0.99 CM
BH CV ECHO MEAS - MED PEAK E' VEL: 3.9 CM/SEC
BH CV ECHO MEAS - MEDIAL E/E' RATIO: 17.7
BH CV ECHO MEAS - MV A MAX VEL: 137.8 CM/SEC
BH CV ECHO MEAS - MV DEC TIME: 0.07 SEC
BH CV ECHO MEAS - MV E MAX VEL: 72.7 CM/SEC
BH CV ECHO MEAS - MV E/A: 0.53
BH CV ECHO MEAS - PA ACC SLOPE: 824.2 CM/SEC^2
BH CV ECHO MEAS - PA ACC TIME: 0.1 SEC
BH CV ECHO MEAS - PA MAX PG: 5 MMHG
BH CV ECHO MEAS - PA PR(ACCEL): 33.1 MMHG
BH CV ECHO MEAS - PA V2 MAX: 111.8 CM/SEC
BH CV ECHO MEAS - SI(CUBED): 6.1 ML/M^2
BH CV ECHO MEAS - SI(LVOT): 27 ML/M^2
BH CV ECHO MEAS - SI(MOD-SP2): 17.4 ML/M^2
BH CV ECHO MEAS - SI(MOD-SP4): 20.4 ML/M^2
BH CV ECHO MEAS - SI(TEICH): 5.7 ML/M^2
BH CV ECHO MEAS - SV(CUBED): 12.3 ML
BH CV ECHO MEAS - SV(LVOT): 54.2 ML
BH CV ECHO MEAS - SV(MOD-SP2): 35 ML
BH CV ECHO MEAS - SV(MOD-SP4): 41 ML
BH CV ECHO MEAS - SV(TEICH): 11.4 ML
BH CV ECHO MEAS - TAPSE (>1.6): 1.8 CM
BH CV ECHO MEASUREMENTS AVERAGE E/E' RATIO: 10.03
BH CV VAS BP LEFT ARM: NORMAL MMHG
BH CV XLRA - RV BASE: 3.4 CM
BH CV XLRA - RV LENGTH: 9.1 CM
BH CV XLRA - RV MID: 2.7 CM
BH CV XLRA - TDI S': 12.9 CM/SEC
LEFT ATRIUM VOLUME INDEX: 24.4 ML/M^2
LEFT ATRIUM VOLUME: 49 ML
MAXIMAL PREDICTED HEART RATE: 164 BPM
STRESS TARGET HR: 139 BPM

## 2020-10-06 PROCEDURE — 93306 TTE W/DOPPLER COMPLETE: CPT | Performed by: INTERNAL MEDICINE

## 2020-10-06 PROCEDURE — 25010000002 SULFUR HEXAFLUORIDE MICROSPH 60.7-25 MG RECONSTITUTED SUSPENSION: Performed by: INTERNAL MEDICINE

## 2020-10-06 PROCEDURE — 93306 TTE W/DOPPLER COMPLETE: CPT

## 2020-10-06 RX ADMIN — SULFUR HEXAFLUORIDE 2 ML: KIT at 13:55

## 2020-10-07 ENCOUNTER — TELEPHONE (OUTPATIENT)
Dept: CARDIOLOGY | Facility: CLINIC | Age: 55
End: 2020-10-07

## 2020-10-07 NOTE — TELEPHONE ENCOUNTER
----- Message from Niraj Valadez MD sent at 10/6/2020  6:16 PM EDT -----  Can we let this patient know that her ejection fraction was 38%.  This is above the level for where we feel a defibrillator is beneficial.  She will not need a defibrillator and if she has the LifeVest she can actually stop wearing it.

## 2020-10-07 NOTE — TELEPHONE ENCOUNTER
Called patient to let them know results and recommendations per NSK (see NSK note).    No answer no voicemail

## 2020-10-08 NOTE — TELEPHONE ENCOUNTER
Spoke with patient and let her know results and recommendations per NSK.    Patient agrees to plan and verbalized understanding.

## 2020-11-11 NOTE — PROGRESS NOTES
[facility] Cardiology  Follow Up Visit  Asuncion Delcid  1965    VISIT DATE:  11/12/20    PCP:   Brett Strong  [place] [facility] [id number]          CC:  Coronary Artery Disease      Problem List:  1.  Ischemic cardiomyopathy/systolic heart failure  -Cardiac cath July 2020: 75% stenosis of mid RCA stented with 3 x 5 x 38 mm ISAIAH  -Nonobstructive 40% LAD stenosis, 30% circumflex stenosis  -EF 45% at time of left heart catheterization  2.  HTN  3.  HLD  4.  DM  5.  Obesity  6.  CKD  7.  Tobacco abuse  8.  JED-needs CPAP       Echo October 2020  EF 38%, grade 1 diastolic dysfunction.  Global hypokinesis with inferior akinesis      History of Present Illness:  Asuncion Delcid  Is a 55 y.o. female with pertinent cardiac history detailed above.  Patient following up for CAD systolic heart failure.  She endorses feeling fatigued somewhat chronically.  She has a diagnosis of sleep apnea but does not currently have a CPAP machine.  The swelling in her legs is much improved on the increased dose of Lasix.  She denies chest pain or dyspnea.  She has not had recent labs to check her renal function.  She has not had any issues with hypotension as he knows.  Tolerating all current medications well.      Patient Active Problem List    Diagnosis Date Noted   • CHF (congestive heart failure) (CMS/[facility]) 07/06/2020     Note Last Updated: 7/6/2020     · EF 40-45%     • Hypertension 07/06/2020   • Hyperlipidemia 07/06/2020   • Sleep apnea 07/06/2020   • Chronic kidney disease (CKD) 07/06/2020   • Diabetes mellitus (CMS/[facility]) 07/06/2020   • Class 1 obesity in adult 07/06/2020   • Tobacco abuse 07/06/2020   • CAD (coronary artery disease) 07/06/2020     Note Last Updated: 7/7/2020     · [facility] 7-6-20: 75% stenosis of the mid right coronary artery stented with 3.5 x 38 mm ISAIAH and reduced to 0%.  Mild left ventricular systolic dysfunction, estimated EF 45%.         Allergies   Allergen Reactions   • Codeine  Unknown - Low Severity   • Penicillins Unknown - Low Severity       Social History     Socioeconomic History   • Marital status:      Spouse name: Not on file   • Number of children: Not on file   • Years of education: Not on file   • Highest education level: Not on file   Tobacco Use   • Smoking status: Current Every Day Smoker     Packs/day: 0.50     Years: 20.00     Pack years: 10.00     Types: Cigarettes   • Smokeless tobacco: Never Used   Substance and Sexual Activity   • Alcohol use: Never     Frequency: Never   • Drug use: Never   • Sexual activity: Defer       History reviewed. No pertinent family history.    Current Medications:    Current Outpatient Medications:   •  albuterol sulfate  (90 Base) MCG/ACT inhaler, Inhale 2 puffs Every 4 (Four) Hours As Needed for Wheezing or Shortness of Air., Disp: , Rfl:   •  aspirin 81 MG chewable tablet, Chew 81 mg Daily., Disp: , Rfl:   •  atorvastatin (LIPITOR) 40 MG tablet, Take 0.5 tablets by mouth Daily., Disp: 90 tablet, Rfl: 3  •  carvedilol (COREG) 3.125 MG tablet, Take 3.125 mg by mouth 2 (Two) Times a Day With Meals., Disp: , Rfl:   •  clopidogrel (PLAVIX) 75 MG tablet, Take 1 tablet by mouth Daily., Disp: 90 tablet, Rfl: 3  •  furosemide (LASIX) 40 MG tablet, Take 1 tablet by mouth 2 (Two) Times a Day., Disp: 60 tablet, Rfl: 6  •  insulin aspart (novoLOG) 100 UNIT/ML injection, Inject  under the skin into the appropriate area as directed As Needed. Sliding scale, Disp: , Rfl:   •  potassium chloride (K-TAB) 20 MEQ tablet controlled-release ER tablet, Take 1 tablet by mouth Daily., Disp: 30 tablet, Rfl: 6  •  ramipril (ALTACE) 5 MG capsule, Take 1 capsule by mouth Daily., Disp: 30 capsule, Rfl: 6     Review of Systems   Constitution: Positive for malaise/fatigue.   Cardiovascular: Negative for chest pain, dyspnea on exertion, irregular heartbeat and leg swelling.   Respiratory: Negative for cough and shortness of breath.        Vitals:    11/12/20  "1139   BP: 128/78   BP Location: Left arm   Patient Position: Sitting   Pulse: 68   Temp: 97.3 °F (36.3 °C)   SpO2: 99%   Weight: 87.8 kg (193 lb 9.6 oz)   Height: 165.1 cm (65\")       Physical Exam  Constitutional:       Appearance: Normal appearance.   HENT:      Head: Normocephalic and atraumatic.   Neck:      Musculoskeletal: Neck supple.      Comments: No JVD  Cardiovascular:      Rate and Rhythm: Normal rate and regular rhythm.      Pulses: Normal pulses.      Heart sounds: No murmur.   Pulmonary:      Effort: Pulmonary effort is normal.      Breath sounds: Normal breath sounds. No rales.   Musculoskeletal:      Right lower leg: No edema.      Left lower leg: No edema.   Skin:     General: Skin is warm and dry.   Neurological:      Mental Status: She is alert.         Diagnostic Data:  Procedures  Lab Results   Component Value Date    TRIG 206 (H) 07/07/2020    HDL 39 (L) 07/07/2020     Lab Results   Component Value Date    GLUCOSE 196 (H) 07/07/2020    BUN 30 (H) 07/07/2020    CREATININE 0.92 07/07/2020     07/07/2020    K 4.2 07/07/2020     07/07/2020    CO2 23.0 07/07/2020     Lab Results   Component Value Date    HGBA1C 12.90 (H) 07/06/2020     Lab Results   Component Value Date    WBC 8.63 07/07/2020    HGB 10.7 (L) 07/07/2020    HCT 38.3 07/07/2020     07/07/2020       Assessment:   Diagnosis Plan   1. Chronic systolic congestive heart failure (CMS/HCC)  Comprehensive Metabolic Panel    Lipid Panel       Plan:    1.  CAD  -Status post RCA PCI July 2020  -Continue aspirin, Plavix, statin  -Nonobstructive left-sided disease  -No angina    2.  Systolic heart failure  -On Coreg and ramipril.  Increase the ramipril to 5 mg today.  -Last EF 38%, does not require ICD  -On Lasix 40 twice daily with 20 mEq potassium  -Recheck renal function and potassium today  -We will try to uptitrate ramipril/Coreg.  Consider initiation of Aldactone at next visit    3.  HTN  -Controlled  -Creasing ramipril " for heart failure treatment     4.  DM  Type 2 DM  -consider adding Farxiga to her regimen.  Will defer to Dr. Strong     5.  HLD  -Continue statin  -DL 69    6.  JED  -needs a CPAP  -Refer to sleep medicine clinic    7. Smoking  -4--5 cigs/day  -making progress, down from 1 ppd  -Declines additional pharmacological assistance    F/u 5 months    Niraj Valadez MD

## 2020-11-12 ENCOUNTER — OFFICE VISIT (OUTPATIENT)
Dept: CARDIOLOGY | Facility: CLINIC | Age: 55
End: 2020-11-12

## 2020-11-12 VITALS
TEMPERATURE: 97.3 F | WEIGHT: 193.6 LBS | DIASTOLIC BLOOD PRESSURE: 78 MMHG | HEART RATE: 68 BPM | OXYGEN SATURATION: 99 % | BODY MASS INDEX: 32.26 KG/M2 | SYSTOLIC BLOOD PRESSURE: 128 MMHG | HEIGHT: 65 IN

## 2020-11-12 DIAGNOSIS — I50.22 CHRONIC SYSTOLIC CONGESTIVE HEART FAILURE (HCC): Primary | ICD-10-CM

## 2020-11-12 PROCEDURE — 99213 OFFICE O/P EST LOW 20 MIN: CPT | Performed by: INTERNAL MEDICINE

## 2020-11-12 RX ORDER — RAMIPRIL 5 MG/1
5 CAPSULE ORAL DAILY
Qty: 30 CAPSULE | Refills: 6 | Status: SHIPPED | OUTPATIENT
Start: 2020-11-12 | End: 2021-04-12 | Stop reason: SDUPTHER

## 2021-04-01 RX ORDER — FUROSEMIDE 40 MG/1
40 TABLET ORAL 2 TIMES DAILY
Qty: 60 TABLET | Refills: 6 | Status: SHIPPED | OUTPATIENT
Start: 2021-04-01 | End: 2021-04-12 | Stop reason: SDUPTHER

## 2021-04-12 RX ORDER — FUROSEMIDE 40 MG/1
40 TABLET ORAL 2 TIMES DAILY
Qty: 180 TABLET | Refills: 1 | Status: SHIPPED | OUTPATIENT
Start: 2021-04-12 | End: 2021-04-15 | Stop reason: SDUPTHER

## 2021-04-12 RX ORDER — RAMIPRIL 5 MG/1
5 CAPSULE ORAL DAILY
Qty: 90 CAPSULE | Refills: 3 | Status: SHIPPED | OUTPATIENT
Start: 2021-04-12 | End: 2021-04-15

## 2021-04-12 RX ORDER — CLOPIDOGREL BISULFATE 75 MG/1
75 TABLET ORAL DAILY
Qty: 90 TABLET | Refills: 3 | Status: SHIPPED | OUTPATIENT
Start: 2021-04-12 | End: 2021-04-15 | Stop reason: SDUPTHER

## 2021-04-12 RX ORDER — ATORVASTATIN CALCIUM 40 MG/1
20 TABLET, FILM COATED ORAL DAILY
Qty: 45 TABLET | Refills: 3 | Status: SHIPPED | OUTPATIENT
Start: 2021-04-12 | End: 2021-04-15 | Stop reason: SDUPTHER

## 2021-04-12 RX ORDER — CARVEDILOL 3.12 MG/1
3.12 TABLET ORAL 2 TIMES DAILY WITH MEALS
Qty: 180 TABLET | Refills: 3 | Status: SHIPPED | OUTPATIENT
Start: 2021-04-12 | End: 2021-04-15 | Stop reason: SDUPTHER

## 2021-04-14 NOTE — PROGRESS NOTES
[facility] Cardiology  Follow Up Visit  Asuncion Delcid  1965    VISIT DATE:  04/15/21    PCP:   Brett Strong  [address]  [facility] [id number]          CC:  Congestive Heart Failure and Coronary Artery Disease      Problem List:  1.  Ischemic cardiomyopathy/systolic heart failure  -Cardiac cath July 2020: 75% stenosis of mid RCA stented with 3 x 5 x 38 mm ISAIAH  -Nonobstructive 40% LAD stenosis, 30% circumflex stenosis  -EF 45% at time of left heart catheterization  2.  HTN  3.  HLD  4.  DM  5.  Obesity  6.  CKD  7.  Tobacco abuse  8.  JED-needs CPAP        Echo October 2020  EF 38%, grade 1 diastolic dysfunction.  Global hypokinesis with inferior akinesis    History of Present Illness:  Asuncion Delcid  Is a 56 y.o. female with pertinent cardiac history detailed above.  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  She needs refills most of her cardiac meds.  She states she is actually been out of her ramipril for about the last month.  She has noticed her left leg is starting to accumulate more fluid.  She states not painful except an area where she still has to do the packing.  Denies chest pain or dyspnea.  She is still smoking.  No other new complaints today      Patient Active Problem List    Diagnosis Date Noted   • CHF (congestive heart failure) (CMS/[facility]) 07/06/2020     Note Last Updated: 7/6/2020     · EF 40-45%     • Hypertension 07/06/2020   • Hyperlipidemia 07/06/2020   • Sleep apnea 07/06/2020   • Chronic kidney disease (CKD) 07/06/2020   • Diabetes mellitus (CMS/[facility]) 07/06/2020   • Class 1 obesity in adult 07/06/2020   • Tobacco abuse 07/06/2020   • CAD (coronary artery disease) 07/06/2020     Note Last Updated: 7/7/2020     · [facility] 7-6-20: 75% stenosis of the mid right coronary artery stented with 3.5 x 38 mm ISAIAH and reduced to 0%.  Mild left ventricular systolic dysfunction, estimated EF 45%.         Allergies   Allergen  Reactions   • Codeine Unknown - Low Severity   • Penicillins Unknown - Low Severity       Social History     Socioeconomic History   • Marital status:      Spouse name: Not on file   • Number of children: Not on file   • Years of education: Not on file   • Highest education level: Not on file   Tobacco Use   • Smoking status: Current Every Day Smoker     Packs/day: 0.50     Years: 20.00     Pack years: 10.00     Types: Cigarettes   • Smokeless tobacco: Never Used   Substance and Sexual Activity   • Alcohol use: Never   • Drug use: Never   • Sexual activity: Defer       History reviewed. No pertinent family history.    Current Medications:    Current Outpatient Medications:   •  albuterol sulfate  (90 Base) MCG/ACT inhaler, Inhale 2 puffs Every 4 (Four) Hours As Needed for Wheezing or Shortness of Air., Disp: , Rfl:   •  aspirin 81 MG chewable tablet, Chew 81 mg Daily., Disp: , Rfl:   •  atorvastatin (LIPITOR) 40 MG tablet, Take 0.5 tablets by mouth Daily., Disp: 45 tablet, Rfl: 3  •  carvedilol (COREG) 3.125 MG tablet, Take 1 tablet by mouth 2 (Two) Times a Day With Meals., Disp: 180 tablet, Rfl: 3  •  clopidogrel (PLAVIX) 75 MG tablet, Take 1 tablet by mouth Daily., Disp: 90 tablet, Rfl: 3  •  furosemide (LASIX) 40 MG tablet, Take 1 tablet by mouth 2 (Two) Times a Day., Disp: 180 tablet, Rfl: 1  •  ibuprofen (ADVIL,MOTRIN) 600 MG tablet, 600 mg As Needed., Disp: , Rfl:   •  insulin aspart (novoLOG) 100 UNIT/ML injection, Inject  under the skin into the appropriate area as directed As Needed. Sliding scale, Disp: , Rfl:   •  potassium chloride (K-TAB) 20 MEQ tablet controlled-release ER tablet, Take 1 tablet by mouth Daily., Disp: 30 tablet, Rfl: 6  •  ramipril (ALTACE) 2.5 MG capsule, 2.5 mg Daily., Disp: , Rfl:      Review of Systems   Cardiovascular: Positive for leg swelling. Negative for chest pain, dyspnea on exertion, irregular heartbeat, orthopnea, palpitations and syncope.   Respiratory:  "Negative for cough and shortness of breath.    All other systems reviewed and are negative.      Vitals:    04/15/21 1403   BP: 130/72   BP Location: Left arm   Patient Position: Sitting   Pulse: 89   SpO2: 99%   Weight: 85.3 kg (188 lb)   Height: 162.6 cm (64\")       Physical Exam  Constitutional:       Appearance: Normal appearance.   Cardiovascular:      Rate and Rhythm: Normal rate and regular rhythm.      Pulses: Normal pulses.      Heart sounds: Normal heart sounds.   Pulmonary:      Effort: Pulmonary effort is normal.      Breath sounds: Normal breath sounds.   Musculoskeletal:      Right lower leg: Edema present.      Left lower leg: Edema present.   Neurological:      General: No focal deficit present.      Mental Status: She is alert.   Psychiatric:         Mood and Affect: Mood normal.         Diagnostic Data:  Procedures  Lab Results   Component Value Date    TRIG 206 (H) 07/07/2020    HDL 39 (L) 07/07/2020     Lab Results   Component Value Date    GLUCOSE 196 (H) 07/07/2020    BUN 30 (H) 07/07/2020    CREATININE 0.92 07/07/2020     07/07/2020    K 4.2 07/07/2020     07/07/2020    CO2 23.0 07/07/2020     Lab Results   Component Value Date    HGBA1C 12.90 (H) 07/06/2020     Lab Results   Component Value Date    WBC 8.63 07/07/2020    HGB 10.7 (L) 07/07/2020    HCT 38.3 07/07/2020     07/07/2020       Assessment:  No diagnosis found.    Plan:    1.  CAD  -Status post RCA PCI July 2020  -Continue aspirin, Plavix, statin.  Refills given today  -Nonobstructive left-sided disease  -No angina     2.  Systolic heart failure  -Last EF 38%, does not require ICD  -On Lasix 40 twice daily with 20 mEq potassium  -Continue Coreg, patient has been off of ramipril, will substitute Entresto in place of ACE inhibitor  -Check left leg duplex to exclude DVT or complications from her recent abscess     3.  HTN  -Controlled     4.  DM  Type 2 DM  -consider adding Farxiga to her regimen given CHF.  Will " defer to Dr. Strong     5.  HLD  -Continue statin  -Most recent LDL acceptable, 69     6.  JED  -Refer to sleep medicine previously  -We will follow-up at next visit to see if she has started CPAP     7. Smoking  -4--5 cigs/day  -Declines additional pharmacological assistance     Follow-up 3 months          Niraj Valadez MD [facility]

## 2021-04-15 ENCOUNTER — OFFICE VISIT (OUTPATIENT)
Dept: CARDIOLOGY | Facility: CLINIC | Age: 56
End: 2021-04-15

## 2021-04-15 VITALS
DIASTOLIC BLOOD PRESSURE: 72 MMHG | OXYGEN SATURATION: 99 % | HEIGHT: 64 IN | HEART RATE: 89 BPM | SYSTOLIC BLOOD PRESSURE: 130 MMHG | BODY MASS INDEX: 32.1 KG/M2 | WEIGHT: 188 LBS

## 2021-04-15 DIAGNOSIS — R60.0 EDEMA LEG: Primary | ICD-10-CM

## 2021-04-15 PROCEDURE — 99214 OFFICE O/P EST MOD 30 MIN: CPT | Performed by: INTERNAL MEDICINE

## 2021-04-15 RX ORDER — IBUPROFEN 600 MG/1
600 TABLET ORAL AS NEEDED
COMMUNITY
Start: 2021-03-26

## 2021-04-15 RX ORDER — FUROSEMIDE 40 MG/1
40 TABLET ORAL 2 TIMES DAILY
Qty: 180 TABLET | Refills: 1 | Status: SHIPPED | OUTPATIENT
Start: 2021-04-15 | End: 2022-07-01 | Stop reason: SDUPTHER

## 2021-04-15 RX ORDER — OXYCODONE HYDROCHLORIDE 5 MG/1
TABLET ORAL
COMMUNITY
Start: 2021-03-26 | End: 2021-04-15

## 2021-04-15 RX ORDER — POTASSIUM CHLORIDE 1500 MG/1
20 TABLET, FILM COATED, EXTENDED RELEASE ORAL DAILY
Qty: 30 TABLET | Refills: 6 | Status: SHIPPED | OUTPATIENT
Start: 2021-04-15 | End: 2022-07-01 | Stop reason: SDUPTHER

## 2021-04-15 RX ORDER — RAMIPRIL 2.5 MG/1
2.5 CAPSULE ORAL DAILY
COMMUNITY
Start: 2021-04-01 | End: 2021-04-15 | Stop reason: ALTCHOICE

## 2021-04-15 RX ORDER — ATORVASTATIN CALCIUM 40 MG/1
20 TABLET, FILM COATED ORAL DAILY
Qty: 45 TABLET | Refills: 3 | Status: SHIPPED | OUTPATIENT
Start: 2021-04-15 | End: 2022-07-01

## 2021-04-15 RX ORDER — CLOPIDOGREL BISULFATE 75 MG/1
75 TABLET ORAL DAILY
Qty: 90 TABLET | Refills: 3 | Status: SHIPPED | OUTPATIENT
Start: 2021-04-15 | End: 2021-09-16 | Stop reason: ALTCHOICE

## 2021-04-15 RX ORDER — CARVEDILOL 3.12 MG/1
3.12 TABLET ORAL 2 TIMES DAILY WITH MEALS
Qty: 180 TABLET | Refills: 3 | Status: SHIPPED | OUTPATIENT
Start: 2021-04-15 | End: 2022-07-01 | Stop reason: SDUPTHER

## 2021-04-15 RX ORDER — CARVEDILOL 3.12 MG/1
3.12 TABLET ORAL 2 TIMES DAILY WITH MEALS
Qty: 180 TABLET | Refills: 3 | Status: SHIPPED | OUTPATIENT
Start: 2021-04-15 | End: 2021-04-15 | Stop reason: SDUPTHER

## 2021-09-16 ENCOUNTER — OFFICE VISIT (OUTPATIENT)
Dept: CARDIOLOGY | Facility: CLINIC | Age: 56
End: 2021-09-16

## 2021-09-16 VITALS
OXYGEN SATURATION: 96 % | HEIGHT: 64 IN | BODY MASS INDEX: 31 KG/M2 | WEIGHT: 181.6 LBS | HEART RATE: 88 BPM | SYSTOLIC BLOOD PRESSURE: 90 MMHG | DIASTOLIC BLOOD PRESSURE: 59 MMHG

## 2021-09-16 DIAGNOSIS — I50.43 ACUTE ON CHRONIC COMBINED SYSTOLIC AND DIASTOLIC CHF (CONGESTIVE HEART FAILURE) (HCC): Primary | ICD-10-CM

## 2021-09-16 PROCEDURE — 93000 ELECTROCARDIOGRAM COMPLETE: CPT | Performed by: INTERNAL MEDICINE

## 2021-09-16 PROCEDURE — 99214 OFFICE O/P EST MOD 30 MIN: CPT | Performed by: INTERNAL MEDICINE

## 2021-09-16 RX ORDER — SPIRONOLACTONE 25 MG/1
12.5 TABLET ORAL DAILY
Qty: 30 TABLET | Refills: 11 | Status: SHIPPED | OUTPATIENT
Start: 2021-09-16 | End: 2022-07-01 | Stop reason: SDUPTHER

## 2021-09-16 NOTE — PROGRESS NOTES
[facility] Cardiology  Follow Up Visit  Asuncion Delcid  1965    VISIT DATE:  09/16/21    PCP:   Brett Strong MD  [address]  [facility] [address]          CC:  Coronary Artery Disease and Edema      Problem List:  1.  Ischemic cardiomyopathy/systolic heart failure  -Cardiac cath July 2020: 75% stenosis of mid RCA stented with 3 x 5 x 38 mm ISAIAH  -Nonobstructive 40% LAD stenosis, 30% circumflex stenosis  -EF 45% at time of left heart catheterization  2.  HTN  3.  HLD  4.  DM  5.  Obesity  6.  CKD  7.  Tobacco abuse  8.  JED-needs CPAP        Echo October 2020  EF 38%, grade 1 diastolic dysfunction.  Global hypokinesis with inferior akinesis    History of Present Illness:  Asuncion Delcid  Is a 56 y.o. female with pertinent cardiac history detailed above.  At last visit Entresto was prescribed for her systolic heart failure.  Her BP is low normal.   EKG sinus no acute changes.  Has some fatigue,  She attributes to this work from moving.  She reports compliance with twice daily Lasix but she still does have 2+ lower extremity edema.  She denies chest pain or dyspnea.  She has been trying to be more active and she is lost about 30 pounds since last year.  She is active taking care of her 3 grandkids.  We discussed since has been a year post PCI she can stop Plavix.      Patient Active Problem List    Diagnosis Date Noted   • CHF (congestive heart failure) (CMS/HCC) 07/06/2020     Note Last Updated: 7/6/2020     · EF 40-45%     • Hypertension 07/06/2020   • Hyperlipidemia 07/06/2020   • Sleep apnea 07/06/2020   • Chronic kidney disease (CKD) 07/06/2020   • Diabetes mellitus (CMS/HCC) 07/06/2020   • Class 1 obesity in adult 07/06/2020   • Tobacco abuse 07/06/2020   • CAD (coronary artery disease) 07/06/2020     Note Last Updated: 7/7/2020     · [facility] 7-6-20: 75% stenosis of the mid right coronary artery stented with 3.5 x 38 mm ISAIAH and reduced to 0%.  Mild left ventricular systolic  "dysfunction, estimated EF 45%.         Allergies   Allergen Reactions   • Codeine Unknown - Low Severity   • Penicillins Unknown - Low Severity       Social History     Socioeconomic History   • Marital status:      Spouse name: Not on file   • Number of children: Not on file   • Years of education: Not on file   • Highest education level: Not on file   Tobacco Use   • Smoking status: Current Every Day Smoker     Packs/day: 0.50     Years: 20.00     Pack years: 10.00     Types: Cigarettes   • Smokeless tobacco: Never Used   Substance and Sexual Activity   • Alcohol use: Never   • Drug use: Never   • Sexual activity: Defer       History reviewed. No pertinent family history.    Current Medications:    Current Outpatient Medications:   •  Admelog 100 UNIT/ML injection, USE PER SLIDING SCALE BEFORE MEALS AND AT BEDTIME, Disp: , Rfl:   •  albuterol sulfate  (90 Base) MCG/ACT inhaler, Inhale 2 puffs As Needed for Wheezing or Shortness of Air., Disp: , Rfl:   •  aspirin 81 MG chewable tablet, Chew 81 mg Daily., Disp: , Rfl:   •  atorvastatin (LIPITOR) 40 MG tablet, Take 0.5 tablets by mouth Daily., Disp: 45 tablet, Rfl: 3  •  carvedilol (COREG) 3.125 MG tablet, Take 1 tablet by mouth 2 (Two) Times a Day With Meals., Disp: 180 tablet, Rfl: 3  •  Easy Comfort Pen Needles 32G X 4 MM misc, USE AS DIRECTED EVERY 12 HOURS, Disp: , Rfl:   •  furosemide (LASIX) 40 MG tablet, Take 1 tablet by mouth 2 (Two) Times a Day., Disp: 180 tablet, Rfl: 1  •  ibuprofen (ADVIL,MOTRIN) 600 MG tablet, 600 mg As Needed., Disp: , Rfl:   •  potassium chloride ER (K-TAB) 20 MEQ tablet controlled-release ER tablet, Take 1 tablet by mouth Daily., Disp: 30 tablet, Rfl: 6  •  sacubitril-valsartan (ENTRESTO) 24-26 MG tablet, Take 1 tablet by mouth 2 (Two) Times a Day., Disp: 60 tablet, Rfl: 6  •  UltiCare Insulin Syringe 30G X 1/2\" 1 ML misc, 4 (Four) Times a Day. as directed, Disp: , Rfl:   •  spironolactone (ALDACTONE) 25 MG tablet, Take " "0.5 tablets by mouth Daily., Disp: 30 tablet, Rfl: 11     Review of Systems   Constitutional: Positive for malaise/fatigue.   Cardiovascular: Positive for leg swelling and palpitations. Negative for chest pain, claudication, dyspnea on exertion and orthopnea.   Hematologic/Lymphatic: Does not bruise/bleed easily.       Vitals:    09/16/21 1104 09/16/21 1110   BP: 93/53 90/59   BP Location: Right arm Right arm   Patient Position: Sitting Sitting   Pulse: 85 88   SpO2: 96% 96%   Weight: 82.4 kg (181 lb 9.6 oz)    Height: 162.6 cm (64\")        Physical Exam  Constitutional:       Appearance: Normal appearance.   HENT:      Head: Normocephalic and atraumatic.   Cardiovascular:      Rate and Rhythm: Normal rate and regular rhythm.      Pulses: Normal pulses.      Heart sounds: Normal heart sounds.   Pulmonary:      Effort: Pulmonary effort is normal.      Breath sounds: Normal breath sounds.   Musculoskeletal:      Right lower leg: Edema present.      Left lower leg: Edema present.      Comments: 2+ pretibial   Neurological:      General: No focal deficit present.      Mental Status: She is alert.   Psychiatric:         Mood and Affect: Mood normal.         Diagnostic Data:    ECG 12 Lead    Date/Time: 9/16/2021 11:18 AM  Performed by: Niraj Valadez MD  Authorized by: Niraj Valadez MD   Comparison: compared with previous ECG from 7/7/2020  Similar to previous ECG  Rhythm: sinus rhythm  Rate: normal  BPM: 83  ST Flattening: I and aVL    Clinical impression: abnormal EKG          Lab Results   Component Value Date    TRIG 206 (H) 07/07/2020    HDL 39 (L) 07/07/2020     Lab Results   Component Value Date    GLUCOSE 196 (H) 07/07/2020    BUN 30 (H) 07/07/2020    CREATININE 0.92 07/07/2020     07/07/2020    K 4.2 07/07/2020     07/07/2020    CO2 23.0 07/07/2020     Lab Results   Component Value Date    HGBA1C 12.90 (H) 07/06/2020     Lab Results   Component Value Date    WBC 8.63 07/07/2020    " HGB 10.7 (L) 07/07/2020    HCT 38.3 07/07/2020     07/07/2020       Assessment:   Diagnosis Plan   1. Acute on chronic combined systolic and diastolic CHF (congestive heart failure) (CMS/[facility])  Comprehensive Metabolic Panel    Lipid Panel    proBNP       Plan:    1.  CAD  -Status post RCA PCI July 2020  -Continue aspirin, statin.  Can stop Plavix today  -Nonobstructive left-sided disease  -No angina     2.  Systolic heart failure  -Last EF 38%, does not require ICD  -On Lasix 40 twice daily with 20 mEq potassium  -Continue Coreg, Entresto   -Add low-dose spironolactone 12.5 mg  -Check CMP, BNP, lipids       3.  HTN  -Controlled      4.  DM  -Type 2 DM  -consider adding Farxiga to her regimen given CHF.  Will defer to Dr. Strong     5.  HLD  -Continue statin  -Most recent LDL acceptable, 69     6.  JED  -Refer to sleep medicine previously she did not follow-up     7. Smoking  -down to 1/2 ppd stable from last visit  -Declines additional pharmacological assistance     Follow-up 4 months      Niraj Valadez MD [facility]

## 2022-02-02 NOTE — PROGRESS NOTES
[facility] at [facility]  TeleHealth Follow Up Visit  Asuncion Delcid  1965    VISIT DATE:  02/03/22    PCP:   Brett Strong MD  [address] [place], [place] [id number]    This patient has consented to a telehealth visit via telephone. The visit was scheduled as a telephone visit to comply with patient safety concerns in accordance with CDC recommendations.  All vitals recorded within this visit are reported by the patient.  I spent  11 minutes in total including but not limited to the 3:30 minutes spent in direct conversation with this patient.        CC:  Acute on chronic combined systolic and diastolic CHF (conges and Leg Swelling      Problem List:  1.  Ischemic cardiomyopathy/systolic heart failure  -Cardiac cath July 2020: 75% stenosis of mid RCA stented with 3 x 5 x 38 mm ISAIAH  -Nonobstructive 40% LAD stenosis, 30% circumflex stenosis  -EF 45% at time of left heart catheterization  2.  HTN  3.  HLD  4.  DM  5.  Obesity  6.  CKD  7.  Tobacco abuse  8.  JED-needs CPAP        Echo October 2020  EF 38%, grade 1 diastolic dysfunction.  Global hypokinesis with inferior akinesis    History of Present Illness:  Asuncion Delcid  Is a 57 y.o. female with pertinent cardiac history detailed above.  Patient reports she is doing well.  She denies chest pain dyspnea on exertion.  She states she no longer has any lower extremity edema.  She has lost about 10 pounds intentionally by watching diet.  She had a recent visit she reports with Dr. Strong received a second COVID-19 vaccination and she states he was pleased with her status no additional changes were made.  Her  is currently sick with Covid at home so they are in quarantine.  She denies any other additional complaints or concerns today      Patient Active Problem List    Diagnosis Date Noted   • CHF (congestive heart failure) (HCC) 07/06/2020     Note Last Updated: 7/6/2020     · EF 40-45%     • Hypertension 07/06/2020   •  Hyperlipidemia 07/06/2020   • Sleep apnea 07/06/2020   • Chronic kidney disease (CKD) 07/06/2020   • Diabetes mellitus (HCC) 07/06/2020   • Class 1 obesity in adult 07/06/2020   • Tobacco abuse 07/06/2020   • CAD (coronary artery disease) 07/06/2020     Note Last Updated: 7/7/2020     · [facility] 7-6-20: 75% stenosis of the mid right coronary artery stented with 3.5 x 38 mm ISAIAH and reduced to 0%.  Mild left ventricular systolic dysfunction, estimated EF 45%.         Allergies   Allergen Reactions   • Codeine Unknown - Low Severity   • Penicillins Unknown - Low Severity       Social History     Socioeconomic History   • Marital status:    Tobacco Use   • Smoking status: Current Every Day Smoker     Packs/day: 0.50     Years: 20.00     Pack years: 10.00     Types: Cigarettes   • Smokeless tobacco: Never Used   Vaping Use   • Vaping Use: Never used   Substance and Sexual Activity   • Alcohol use: Never   • Drug use: Never   • Sexual activity: Defer       History reviewed. No pertinent family history.    Current Medications:    Current Outpatient Medications:   •  aspirin 81 MG chewable tablet, Chew 81 mg Daily., Disp: , Rfl:   •  atorvastatin (LIPITOR) 40 MG tablet, Take 0.5 tablets by mouth Daily., Disp: 45 tablet, Rfl: 3  •  carvedilol (COREG) 3.125 MG tablet, Take 1 tablet by mouth 2 (Two) Times a Day With Meals., Disp: 180 tablet, Rfl: 3  •  Easy Comfort Pen Needles 32G X 4 MM misc, USE AS DIRECTED EVERY 12 HOURS, Disp: , Rfl:   •  furosemide (LASIX) 40 MG tablet, Take 1 tablet by mouth 2 (Two) Times a Day., Disp: 180 tablet, Rfl: 1  •  ibuprofen (ADVIL,MOTRIN) 600 MG tablet, 600 mg As Needed., Disp: , Rfl:   •  potassium chloride ER (K-TAB) 20 MEQ tablet controlled-release ER tablet, Take 1 tablet by mouth Daily., Disp: 30 tablet, Rfl: 6  •  sacubitril-valsartan (ENTRESTO) 24-26 MG tablet, Take 1 tablet by mouth 2 (Two) Times a Day., Disp: 60 tablet, Rfl: 6  •  spironolactone (ALDACTONE) 25 MG tablet, Take 0.5  "tablets by mouth Daily., Disp: 30 tablet, Rfl: 11  •  Toujeo SoloStar 300 UNIT/ML solution pen-injector injection, INJECT 80 UNITS ONCE A DAY, Disp: , Rfl:   •  UltiCare Insulin Syringe 30G X 1/2\" 1 ML misc, 4 (Four) Times a Day. as directed, Disp: , Rfl:      Review of Systems   Constitutional: Positive for weight loss (intentional 10 lbs).   Cardiovascular: Negative for chest pain, claudication, dyspnea on exertion, leg swelling, orthopnea and palpitations.   Respiratory: Negative for cough.        Vitals:    02/03/22 0919   BP: 103/58   BP Location: Left arm   Patient Position: Sitting   Pulse: 98   SpO2: 96%   Weight: 78.5 kg (173 lb)   Height: 162.6 cm (64\")     Limited exam secondary to televisit    Physical Exam  Constitutional:       General: She is not in acute distress.  Cardiovascular:      Rate and Rhythm: Normal rate.   Pulmonary:      Comments: No audible distress  Neurological:      Mental Status: She is oriented to person, place, and time.   Psychiatric:         Mood and Affect: Mood normal.         Diagnostic Data:  Procedures  Lab Results   Component Value Date    TRIG 206 (H) 07/07/2020    HDL 39 (L) 07/07/2020     Lab Results   Component Value Date    GLUCOSE 196 (H) 07/07/2020    BUN 30 (H) 07/07/2020    CREATININE 0.92 07/07/2020     07/07/2020    K 4.2 07/07/2020     07/07/2020    CO2 23.0 07/07/2020     Lab Results   Component Value Date    HGBA1C 12.90 (H) 07/06/2020     Lab Results   Component Value Date    WBC 8.63 07/07/2020    HGB 10.7 (L) 07/07/2020    HCT 38.3 07/07/2020     07/07/2020       Assessment:  No diagnosis found.    Plan:    1.  CAD  -Status post RCA PCI July 2020  -Continue aspirin, statin.     -She denies any chest pain at this time  -Nonobstructive LAD/circumflex disease       2.  Chronic systolic heart failure  -Last EF 38%, does not require ICD  -On Lasix 40 twice daily with 20 mEq potassium.  She states edema has resolved  -Continue Coreg, Entresto, " spironolactone 12.5  -Most recent available creatinine 0.73, most recent available        3.  HTN  -Controlled on current regimen      4.  DM  -Type 2 DM  -consider adding Farxiga to her regimen given CHF.  Will defer to Dr. Strong     5.  HLD  -Continue atorvastatin 40mg  -Most recent LDL acceptable, 69     6.  JED  -Did not follow-up with sleep medicine.     7. Smoking  -still smoking less than a half pack per day  -Previously has declined pharmacologic assistance    She is vaccinated against COVID-19.    Follow-up in about 4 months or sooner as needed      Niraj Valadez MD

## 2022-02-03 ENCOUNTER — OFFICE VISIT (OUTPATIENT)
Dept: CARDIOLOGY | Facility: CLINIC | Age: 57
End: 2022-02-03

## 2022-02-03 VITALS
HEIGHT: 64 IN | SYSTOLIC BLOOD PRESSURE: 103 MMHG | BODY MASS INDEX: 29.53 KG/M2 | HEART RATE: 98 BPM | OXYGEN SATURATION: 96 % | DIASTOLIC BLOOD PRESSURE: 58 MMHG | WEIGHT: 173 LBS

## 2022-02-03 DIAGNOSIS — I50.22 CHRONIC SYSTOLIC CONGESTIVE HEART FAILURE: Primary | ICD-10-CM

## 2022-02-03 PROCEDURE — 99212 OFFICE O/P EST SF 10 MIN: CPT | Performed by: INTERNAL MEDICINE

## 2022-02-03 RX ORDER — INSULIN GLARGINE 300 U/ML
INJECTION, SOLUTION SUBCUTANEOUS
COMMUNITY
Start: 2021-11-22

## 2022-07-01 ENCOUNTER — TELEPHONE (OUTPATIENT)
Dept: CARDIOLOGY | Facility: CLINIC | Age: 57
End: 2022-07-01

## 2022-07-01 RX ORDER — SPIRONOLACTONE 25 MG/1
12.5 TABLET ORAL DAILY
Qty: 15 TABLET | Refills: 0 | Status: SHIPPED | OUTPATIENT
Start: 2022-07-01 | End: 2022-08-08 | Stop reason: SDUPTHER

## 2022-07-01 RX ORDER — POTASSIUM CHLORIDE 1500 MG/1
20 TABLET, FILM COATED, EXTENDED RELEASE ORAL DAILY
Qty: 30 TABLET | Refills: 0 | Status: SHIPPED | OUTPATIENT
Start: 2022-07-01 | End: 2022-08-08 | Stop reason: SDUPTHER

## 2022-07-01 RX ORDER — CARVEDILOL 3.12 MG/1
3.12 TABLET ORAL 2 TIMES DAILY WITH MEALS
Qty: 60 TABLET | Refills: 0 | Status: SHIPPED | OUTPATIENT
Start: 2022-07-01 | End: 2022-08-08 | Stop reason: SDUPTHER

## 2022-07-01 RX ORDER — FUROSEMIDE 40 MG/1
40 TABLET ORAL 2 TIMES DAILY
Qty: 60 TABLET | Refills: 0 | Status: SHIPPED | OUTPATIENT
Start: 2022-07-01 | End: 2022-08-08 | Stop reason: SDUPTHER

## 2022-07-01 RX ORDER — ATORVASTATIN CALCIUM 20 MG/1
20 TABLET, FILM COATED ORAL DAILY
Qty: 30 TABLET | Refills: 0 | Status: SHIPPED | OUTPATIENT
Start: 2022-07-01 | End: 2022-08-08 | Stop reason: SDUPTHER

## 2022-07-01 NOTE — TELEPHONE ENCOUNTER
PT left a message this morning very upset that no one has refilled her medication. She states that she has left multiple messages this week, with no return call.     I tried to call pt back at 1012 AM but there was no answer and her VM box is full, so I was unable to leave a message. PT did not leave in her message which medication she needs refilled. Will attempt to call again later today

## 2022-07-01 NOTE — TELEPHONE ENCOUNTER
Spoke with pt- she needs all her medications refilled- I explained that we also need updated labs- told her that I will send in a 30 day supply of the requested medications and she agreed to go get the labs done as soon as she gets the orders. Will mail them to the pt.

## 2022-07-07 ENCOUNTER — TELEPHONE (OUTPATIENT)
Dept: CARDIOLOGY | Facility: CLINIC | Age: 57
End: 2022-07-07

## 2022-07-07 NOTE — TELEPHONE ENCOUNTER
Pt LVM requesting call back from RN. Attempted to reach pt, no answer, voicemail box full - unable to leave message. Will await return call.

## 2022-07-14 DIAGNOSIS — I50.43 ACUTE ON CHRONIC COMBINED SYSTOLIC AND DIASTOLIC CHF (CONGESTIVE HEART FAILURE): ICD-10-CM

## 2022-08-08 RX ORDER — ATORVASTATIN CALCIUM 20 MG/1
20 TABLET, FILM COATED ORAL NIGHTLY
Qty: 90 TABLET | Refills: 3 | Status: SHIPPED | OUTPATIENT
Start: 2022-08-08

## 2022-08-08 RX ORDER — SPIRONOLACTONE 25 MG/1
12.5 TABLET ORAL DAILY
Qty: 45 TABLET | Refills: 3 | Status: SHIPPED | OUTPATIENT
Start: 2022-08-08

## 2022-08-08 RX ORDER — POTASSIUM CHLORIDE 1500 MG/1
20 TABLET, FILM COATED, EXTENDED RELEASE ORAL DAILY
Qty: 90 TABLET | Refills: 3 | Status: SHIPPED | OUTPATIENT
Start: 2022-08-08

## 2022-08-08 RX ORDER — FUROSEMIDE 40 MG/1
40 TABLET ORAL 2 TIMES DAILY
Qty: 180 TABLET | Refills: 3 | Status: SHIPPED | OUTPATIENT
Start: 2022-08-08

## 2022-08-08 RX ORDER — ASPIRIN 81 MG/1
81 TABLET, CHEWABLE ORAL DAILY
Qty: 90 TABLET | Refills: 3 | Status: SHIPPED | OUTPATIENT
Start: 2022-08-08

## 2022-08-08 RX ORDER — CARVEDILOL 3.12 MG/1
3.12 TABLET ORAL 2 TIMES DAILY WITH MEALS
Qty: 180 TABLET | Refills: 3 | Status: SHIPPED | OUTPATIENT
Start: 2022-08-08

## (undated) DEVICE — ST INF PRI SMRTSTE 20DRP 2VLV 24ML 117

## (undated) DEVICE — GW INQWIRE FC PTFE STD J/1.5 .035 260

## (undated) DEVICE — SKIN PREP TRAY W/CHG: Brand: MEDLINE INDUSTRIES, INC.

## (undated) DEVICE — DEV COMP RAD PRELUDESYNC 24CM

## (undated) DEVICE — DEV INFL MONARCH 25W

## (undated) DEVICE — GW LUGE .014 182 CM

## (undated) DEVICE — PK CATH CARD 10

## (undated) DEVICE — MODEL AT P65, P/N 701554-001KIT CONTENTS: HAND CONTROLLER, 3-WAY HIGH-PRESSURE STOPCOCK WITH ROTATING END AND PREMIUM HIGH-PRESSURE TUBING: Brand: ANGIOTOUCH® KIT

## (undated) DEVICE — INTRO SHEATH PRELUDE IDEAL SPRNG COIL 021 6F 23X80CM

## (undated) DEVICE — CATH DIAG EXPO M/ PK 5F FL4/FR4 PIG

## (undated) DEVICE — GUIDE CATHETER: Brand: MACH1™

## (undated) DEVICE — CATH DIAG EXPO .045 FL3  5F 100CM

## (undated) DEVICE — MODEL BT2000 P/N 700287-012KIT CONTENTS: MANIFOLD WITH SALINE AND CONTRAST PORTS, SALINE TUBING WITH SPIKE AND HAND SYRINGE, TRANSDUCER: Brand: BT2000 AUTOMATED MANIFOLD KIT